# Patient Record
Sex: FEMALE | Race: OTHER
[De-identification: names, ages, dates, MRNs, and addresses within clinical notes are randomized per-mention and may not be internally consistent; named-entity substitution may affect disease eponyms.]

---

## 2018-06-20 ENCOUNTER — LABORATORY RESULT (OUTPATIENT)
Age: 38
End: 2018-06-20

## 2018-06-20 ENCOUNTER — APPOINTMENT (OUTPATIENT)
Dept: HEMATOLOGY ONCOLOGY | Facility: CLINIC | Age: 38
End: 2018-06-20

## 2018-06-20 VITALS
BODY MASS INDEX: 27.32 KG/M2 | TEMPERATURE: 96.4 F | HEIGHT: 65 IN | DIASTOLIC BLOOD PRESSURE: 88 MMHG | WEIGHT: 164 LBS | RESPIRATION RATE: 14 BRPM | HEART RATE: 75 BPM | SYSTOLIC BLOOD PRESSURE: 147 MMHG

## 2018-06-20 DIAGNOSIS — R76.0 RAISED ANTIBODY TITER: ICD-10-CM

## 2018-06-20 DIAGNOSIS — D47.3 ESSENTIAL (HEMORRHAGIC) THROMBOCYTHEMIA: ICD-10-CM

## 2018-06-20 DIAGNOSIS — I10 ESSENTIAL (PRIMARY) HYPERTENSION: ICD-10-CM

## 2018-06-20 DIAGNOSIS — N28.1 CYST OF KIDNEY, ACQUIRED: ICD-10-CM

## 2018-06-20 DIAGNOSIS — E83.19 OTHER DISORDERS OF IRON METABOLISM: ICD-10-CM

## 2018-06-21 PROBLEM — N28.1 KIDNEY CYSTS: Status: RESOLVED | Noted: 2018-06-21 | Resolved: 2018-06-21

## 2018-06-21 PROBLEM — I10 HYPERTENSION: Status: ACTIVE | Noted: 2018-06-21

## 2018-06-21 PROBLEM — R76.0 ANTIPHOSPHOLIPID ANTIBODY POSITIVE: Status: RESOLVED | Noted: 2018-06-21 | Resolved: 2018-06-21

## 2018-06-25 LAB
ALBUMIN SERPL ELPH-MCNC: 4.8 G/DL
ALP BLD-CCNC: 82 U/L
ALT SERPL-CCNC: 34 U/L
ANION GAP SERPL CALC-SCNC: 15 MMOL/L
AST SERPL-CCNC: 30 U/L
BILIRUB SERPL-MCNC: <0.2 MG/DL
BUN SERPL-MCNC: 13 MG/DL
CALCIUM SERPL-MCNC: 9.1 MG/DL
CHLORIDE SERPL-SCNC: 96 MMOL/L
CO2 SERPL-SCNC: 27 MMOL/L
CREAT SERPL-MCNC: 0.5 MG/DL
FERRITIN SERPL-MCNC: 241 NG/ML
GLUCOSE SERPL-MCNC: 164 MG/DL
HCT VFR BLD CALC: 39.1 %
HGB BLD-MCNC: 13.4 G/DL
IRON SATN MFR SERPL: 20 %
IRON SERPL-MCNC: 59 UG/DL
MCHC RBC-ENTMCNC: 30.5 PG
MCHC RBC-ENTMCNC: 34.3 G/DL
MCV RBC AUTO: 89.1 FL
PLATELET # BLD AUTO: 433 K/UL
PMV BLD: 9.2 FL
POTASSIUM SERPL-SCNC: 3.8 MMOL/L
PROT SERPL-MCNC: 8.3 G/DL
RBC # BLD: 4.39 M/UL
RBC # FLD: 11.9 %
SODIUM SERPL-SCNC: 138 MMOL/L
TIBC SERPL-MCNC: 301 UG/DL
TM INTERPRETATION: NORMAL
UIBC SERPL-MCNC: 242 UG/DL
WBC # FLD AUTO: 9.78 K/UL

## 2018-07-25 ENCOUNTER — APPOINTMENT (OUTPATIENT)
Dept: HEMATOLOGY ONCOLOGY | Facility: CLINIC | Age: 38
End: 2018-07-25

## 2018-07-25 ENCOUNTER — OUTPATIENT (OUTPATIENT)
Dept: OUTPATIENT SERVICES | Facility: HOSPITAL | Age: 38
LOS: 1 days | Discharge: HOME | End: 2018-07-25

## 2018-07-25 VITALS
HEART RATE: 66 BPM | DIASTOLIC BLOOD PRESSURE: 95 MMHG | SYSTOLIC BLOOD PRESSURE: 173 MMHG | WEIGHT: 164 LBS | RESPIRATION RATE: 16 BRPM | TEMPERATURE: 96.5 F | HEIGHT: 65 IN | BODY MASS INDEX: 27.32 KG/M2

## 2018-07-25 DIAGNOSIS — D47.3 ESSENTIAL (HEMORRHAGIC) THROMBOCYTHEMIA: ICD-10-CM

## 2018-09-19 ENCOUNTER — APPOINTMENT (OUTPATIENT)
Dept: HEMATOLOGY ONCOLOGY | Facility: CLINIC | Age: 38
End: 2018-09-19

## 2019-04-23 ENCOUNTER — APPOINTMENT (OUTPATIENT)
Dept: HEMATOLOGY ONCOLOGY | Facility: CLINIC | Age: 39
End: 2019-04-23

## 2019-07-11 ENCOUNTER — APPOINTMENT (OUTPATIENT)
Dept: GASTROENTEROLOGY | Facility: CLINIC | Age: 39
End: 2019-07-11

## 2019-07-11 VITALS
WEIGHT: 171 LBS | HEART RATE: 70 BPM | DIASTOLIC BLOOD PRESSURE: 90 MMHG | HEIGHT: 65 IN | BODY MASS INDEX: 28.49 KG/M2 | SYSTOLIC BLOOD PRESSURE: 180 MMHG

## 2022-02-11 ENCOUNTER — APPOINTMENT (OUTPATIENT)
Age: 42
End: 2022-02-11

## 2022-03-10 ENCOUNTER — APPOINTMENT (OUTPATIENT)
Age: 42
End: 2022-03-10

## 2023-06-19 ENCOUNTER — APPOINTMENT (OUTPATIENT)
Dept: ORTHOPEDIC SURGERY | Facility: CLINIC | Age: 43
End: 2023-06-19

## 2023-06-26 ENCOUNTER — NON-APPOINTMENT (OUTPATIENT)
Age: 43
End: 2023-06-26

## 2023-06-26 ENCOUNTER — APPOINTMENT (OUTPATIENT)
Dept: OTOLARYNGOLOGY | Facility: CLINIC | Age: 43
End: 2023-06-26
Payer: COMMERCIAL

## 2023-06-26 DIAGNOSIS — R05.9 COUGH, UNSPECIFIED: ICD-10-CM

## 2023-06-26 DIAGNOSIS — R09.82 POSTNASAL DRIP: ICD-10-CM

## 2023-06-26 PROCEDURE — 99204 OFFICE O/P NEW MOD 45 MIN: CPT | Mod: 25

## 2023-06-26 PROCEDURE — 31231 NASAL ENDOSCOPY DX: CPT

## 2023-06-26 NOTE — ASSESSMENT
[FreeTextEntry1] : continue augmentin.\par \par will need to switch Losartan later for possible persistent dry cough since she started it. \par \par Advised to do Flonase BID. \par Stop azelastine for now.

## 2023-06-26 NOTE — HISTORY OF PRESENT ILLNESS
[de-identified] : Patient presents today c/o coughing , PND , restlessness, congestion, rhinorrhea.   Patient was sick last week with hoarseness , coughing ,  PND . Presented with thick green sputum. She took Augmentin and is feeling some improvement. Has pain when swallowing.

## 2023-07-18 ENCOUNTER — APPOINTMENT (OUTPATIENT)
Dept: PULMONOLOGY | Facility: CLINIC | Age: 43
End: 2023-07-18

## 2023-12-20 ENCOUNTER — APPOINTMENT (OUTPATIENT)
Dept: OTOLARYNGOLOGY | Facility: CLINIC | Age: 43
End: 2023-12-20
Payer: COMMERCIAL

## 2023-12-20 DIAGNOSIS — R59.1 GENERALIZED ENLARGED LYMPH NODES: ICD-10-CM

## 2023-12-20 PROCEDURE — 99204 OFFICE O/P NEW MOD 45 MIN: CPT

## 2023-12-20 PROCEDURE — 99214 OFFICE O/P EST MOD 30 MIN: CPT

## 2023-12-20 NOTE — ASSESSMENT
[FreeTextEntry1] : Koki is a pleasant 41 yo female presenting with bilateral, painful level I LAD, likely secondary to recent dental work and TMJ pain  Recommend Augmentin 875/125 BID for 2 weeks. Keep follow up with dentist  Recommend TMJ management with advil, massage, stretches.  Follow up with me in 3-4 weeks.

## 2023-12-20 NOTE — HISTORY OF PRESENT ILLNESS
[FreeTextEntry1] : Patient presents today for c/o swollen lymph nodes. It started two nights ago on the right side and moved to the left. She feels as if her face is swollen. She is only getting clear mucus out of the nose. No further complaints.

## 2023-12-20 NOTE — PHYSICAL EXAM
[Midline] : trachea located in midline position [FreeTextEntry1] : Well appearing [Normal] : temporomandibular joint is normal [de-identified] : mild to moderate LAD of bilateral level 1 [de-identified] : Crepitus on palpation of left TMJ worse than right

## 2024-02-14 RX ORDER — AMOXICILLIN AND CLAVULANATE POTASSIUM 875; 125 MG/1; MG/1
875-125 TABLET, COATED ORAL
Qty: 28 | Refills: 0 | Status: DISCONTINUED | COMMUNITY
Start: 2023-12-20 | End: 2024-02-14

## 2024-02-20 ENCOUNTER — APPOINTMENT (OUTPATIENT)
Dept: GASTROENTEROLOGY | Facility: CLINIC | Age: 44
End: 2024-02-20
Payer: COMMERCIAL

## 2024-02-20 DIAGNOSIS — Z78.9 OTHER SPECIFIED HEALTH STATUS: ICD-10-CM

## 2024-02-20 DIAGNOSIS — R12 HEARTBURN: ICD-10-CM

## 2024-02-20 DIAGNOSIS — Z82.49 FAMILY HISTORY OF ISCHEMIC HEART DISEASE AND OTHER DISEASES OF THE CIRCULATORY SYSTEM: ICD-10-CM

## 2024-02-20 DIAGNOSIS — R73.03 PREDIABETES.: ICD-10-CM

## 2024-02-20 DIAGNOSIS — Z87.891 PERSONAL HISTORY OF NICOTINE DEPENDENCE: ICD-10-CM

## 2024-02-20 DIAGNOSIS — Z86.39 PERSONAL HISTORY OF OTHER ENDOCRINE, NUTRITIONAL AND METABOLIC DISEASE: ICD-10-CM

## 2024-02-20 DIAGNOSIS — K21.9 GASTRO-ESOPHAGEAL REFLUX DISEASE W/OUT ESOPHAGITIS: ICD-10-CM

## 2024-02-20 DIAGNOSIS — Z86.79 PERSONAL HISTORY OF OTHER DISEASES OF THE CIRCULATORY SYSTEM: ICD-10-CM

## 2024-02-20 PROCEDURE — 99204 OFFICE O/P NEW MOD 45 MIN: CPT

## 2024-02-20 RX ORDER — CAMPHOR 4%, MENTHOL 10%, METHYL SALICYLATE 30% 4; 10; 30 G/100ML; G/100ML; G/100ML
SPRAY TOPICAL
Refills: 0 | Status: ACTIVE | COMMUNITY

## 2024-02-20 RX ORDER — PANTOPRAZOLE 40 MG/1
40 TABLET, DELAYED RELEASE ORAL
Refills: 0 | Status: ACTIVE | COMMUNITY

## 2024-02-20 RX ORDER — PANTOPRAZOLE 40 MG/1
40 TABLET, DELAYED RELEASE ORAL DAILY
Qty: 1 | Refills: 6 | Status: ACTIVE | COMMUNITY
Start: 2024-02-20 | End: 1900-01-01

## 2024-02-20 RX ORDER — AMLODIPINE BESYLATE 5 MG/1
TABLET ORAL
Refills: 0 | Status: ACTIVE | COMMUNITY

## 2024-02-20 RX ORDER — LABETALOL HYDROCHLORIDE 300 MG/1
TABLET, FILM COATED ORAL
Refills: 0 | Status: ACTIVE | COMMUNITY

## 2024-02-20 RX ORDER — TIRZEPATIDE 5 MG/.5ML
5 INJECTION, SOLUTION SUBCUTANEOUS
Refills: 0 | Status: ACTIVE | COMMUNITY

## 2024-02-20 RX ORDER — LOSARTAN POTASSIUM 100 MG/1
TABLET, FILM COATED ORAL
Refills: 0 | Status: ACTIVE | COMMUNITY

## 2024-02-20 NOTE — HISTORY OF PRESENT ILLNESS
[FreeTextEntry1] : 43 year old female patient average risk for CRC, GISELA on CPAP, pre DM, HTN, HLP, now on munjaro, presents with worsening chronic GERD symptoms, namely at night with regurgitations. No nausea, vomiting or dysphagia. No GERD during the day.  Also went to Dr Brown and has LPR and prescribed PPI.  No unintended weight loss.  No blood in the stool, but has worsening constipation after starting munjaro.

## 2024-02-20 NOTE — PHYSICAL EXAM
[General Appearance - Alert] : alert [Sclera] : the sclera and conjunctiva were normal [Neck Appearance] : the appearance of the neck was normal [Abdomen Soft] : soft [Abdomen Tenderness] : non-tender [Skin Color & Pigmentation] : normal skin color and pigmentation [Oriented To Time, Place, And Person] : oriented to person, place, and time

## 2024-02-21 ENCOUNTER — RX RENEWAL (OUTPATIENT)
Age: 44
End: 2024-02-21

## 2024-03-12 RX ORDER — FAMOTIDINE 40 MG/1
40 TABLET, FILM COATED ORAL
Qty: 90 | Refills: 3 | Status: ACTIVE | COMMUNITY
Start: 2024-02-20 | End: 1900-01-01

## 2024-05-29 RX ORDER — PANTOPRAZOLE 40 MG/1
40 TABLET, DELAYED RELEASE ORAL
Qty: 90 | Refills: 0 | Status: ACTIVE | COMMUNITY
Start: 2024-01-17 | End: 1900-01-01

## 2024-10-07 ENCOUNTER — RX RENEWAL (OUTPATIENT)
Age: 44
End: 2024-10-07

## 2025-04-10 ENCOUNTER — RX RENEWAL (OUTPATIENT)
Age: 45
End: 2025-04-10

## 2025-06-26 ENCOUNTER — APPOINTMENT (OUTPATIENT)
Dept: OTOLARYNGOLOGY | Facility: CLINIC | Age: 45
End: 2025-06-26
Payer: COMMERCIAL

## 2025-06-26 PROCEDURE — 31575 DIAGNOSTIC LARYNGOSCOPY: CPT

## 2025-06-26 PROCEDURE — 99214 OFFICE O/P EST MOD 30 MIN: CPT | Mod: 25

## 2025-06-26 RX ORDER — BENZONATATE 100 MG/1
100 CAPSULE ORAL
Qty: 90 | Refills: 0 | Status: ACTIVE | COMMUNITY
Start: 2025-06-26 | End: 1900-01-01

## 2025-06-26 RX ORDER — FLUTICASONE PROPIONATE 50 UG/1
50 SPRAY NASAL TWICE DAILY
Qty: 1 | Refills: 11 | Status: ACTIVE | COMMUNITY
Start: 2025-06-26 | End: 1900-01-01

## 2025-06-26 RX ORDER — AZELASTINE 137 UG/1
137 SPRAY, METERED NASAL TWICE DAILY
Qty: 1 | Refills: 6 | Status: ACTIVE | COMMUNITY
Start: 2025-06-26 | End: 1900-01-01

## 2025-06-26 RX ORDER — DIPHENHYDRAMINE HYDROCHLORIDE AND LIDOCAINE HYDROCHLORIDE AND ALUMINUM HYDROXIDE AND MAGNESIUM HYDRO
KIT
Qty: 1 | Refills: 3 | Status: ACTIVE | COMMUNITY
Start: 2025-06-26 | End: 1900-01-01

## 2025-06-27 ENCOUNTER — TRANSCRIPTION ENCOUNTER (OUTPATIENT)
Age: 45
End: 2025-06-27

## 2025-06-30 LAB — BACTERIA THROAT CULT: NORMAL

## 2025-07-02 ENCOUNTER — APPOINTMENT (OUTPATIENT)
Dept: PULMONOLOGY | Facility: CLINIC | Age: 45
End: 2025-07-02
Payer: COMMERCIAL

## 2025-07-02 VITALS
SYSTOLIC BLOOD PRESSURE: 148 MMHG | WEIGHT: 150 LBS | DIASTOLIC BLOOD PRESSURE: 88 MMHG | HEIGHT: 65 IN | HEART RATE: 88 BPM | OXYGEN SATURATION: 97 % | BODY MASS INDEX: 24.99 KG/M2

## 2025-07-02 PROBLEM — J06.9 VIRAL UPPER RESPIRATORY TRACT INFECTION: Status: ACTIVE | Noted: 2025-06-26 | Resolved: 2025-07-26

## 2025-07-02 PROCEDURE — 99204 OFFICE O/P NEW MOD 45 MIN: CPT | Mod: 25

## 2025-07-02 PROCEDURE — 71046 X-RAY EXAM CHEST 2 VIEWS: CPT

## 2025-07-02 RX ORDER — BUDESONIDE AND FORMOTEROL FUMARATE DIHYDRATE 80; 4.5 UG/1; UG/1
80-4.5 AEROSOL RESPIRATORY (INHALATION) TWICE DAILY
Qty: 1 | Refills: 1 | Status: ACTIVE | COMMUNITY
Start: 2025-07-02 | End: 1900-01-01

## 2025-07-02 RX ORDER — PREDNISONE 20 MG/1
20 TABLET ORAL
Qty: 15 | Refills: 0 | Status: ACTIVE | COMMUNITY
Start: 2025-07-02 | End: 1900-01-01

## 2025-07-07 ENCOUNTER — RX RENEWAL (OUTPATIENT)
Age: 45
End: 2025-07-07

## 2025-07-09 ENCOUNTER — OUTPATIENT (OUTPATIENT)
Dept: OUTPATIENT SERVICES | Facility: HOSPITAL | Age: 45
LOS: 1 days | End: 2025-07-09
Payer: COMMERCIAL

## 2025-07-09 DIAGNOSIS — J45.998 OTHER ASTHMA: ICD-10-CM

## 2025-07-09 PROCEDURE — 87205 SMEAR GRAM STAIN: CPT

## 2025-07-09 PROCEDURE — 87070 CULTURE OTHR SPECIMN AEROBIC: CPT

## 2025-07-09 PROCEDURE — 87181 SC STD AGAR DILUTION PER AGT: CPT

## 2025-07-09 PROCEDURE — 87077 CULTURE AEROBIC IDENTIFY: CPT

## 2025-07-10 DIAGNOSIS — J45.998 OTHER ASTHMA: ICD-10-CM

## 2025-07-13 LAB — BACTERIA SPT CULT: ABNORMAL

## 2025-07-16 ENCOUNTER — APPOINTMENT (OUTPATIENT)
Dept: PULMONOLOGY | Facility: CLINIC | Age: 45
End: 2025-07-16
Payer: COMMERCIAL

## 2025-07-16 VITALS — DIASTOLIC BLOOD PRESSURE: 78 MMHG | OXYGEN SATURATION: 98 % | SYSTOLIC BLOOD PRESSURE: 122 MMHG | HEART RATE: 66 BPM

## 2025-07-16 PROCEDURE — 99214 OFFICE O/P EST MOD 30 MIN: CPT

## 2025-07-16 PROCEDURE — G2211 COMPLEX E/M VISIT ADD ON: CPT | Mod: NC

## 2025-07-22 ENCOUNTER — NON-APPOINTMENT (OUTPATIENT)
Age: 45
End: 2025-07-22

## 2025-07-24 ENCOUNTER — APPOINTMENT (OUTPATIENT)
Dept: GASTROENTEROLOGY | Facility: CLINIC | Age: 45
End: 2025-07-24
Payer: COMMERCIAL

## 2025-07-24 VITALS — HEIGHT: 65 IN | BODY MASS INDEX: 24.99 KG/M2 | WEIGHT: 150 LBS

## 2025-07-24 DIAGNOSIS — Z86.39 PERSONAL HISTORY OF OTHER ENDOCRINE, NUTRITIONAL AND METABOLIC DISEASE: ICD-10-CM

## 2025-07-24 DIAGNOSIS — Z12.11 ENCOUNTER FOR SCREENING FOR MALIGNANT NEOPLASM OF COLON: ICD-10-CM

## 2025-07-24 DIAGNOSIS — K21.9 GASTRO-ESOPHAGEAL REFLUX DISEASE W/OUT ESOPHAGITIS: ICD-10-CM

## 2025-07-24 DIAGNOSIS — Z86.79 PERSONAL HISTORY OF OTHER DISEASES OF THE CIRCULATORY SYSTEM: ICD-10-CM

## 2025-07-24 PROCEDURE — 99214 OFFICE O/P EST MOD 30 MIN: CPT

## 2025-07-24 RX ORDER — ALBUTEROL SULFATE 90 UG/1
108 (90 BASE) INHALANT RESPIRATORY (INHALATION)
Qty: 1 | Refills: 3 | Status: DISCONTINUED | COMMUNITY
Start: 2025-07-16 | End: 2025-07-24

## 2025-07-24 RX ORDER — SODIUM SULFATE, MAGNESIUM SULFATE, AND POTASSIUM CHLORIDE 17.75; 2.7; 2.25 G/1; G/1; G/1
1479-225-188 TABLET ORAL
Qty: 24 | Refills: 0 | Status: ACTIVE | COMMUNITY
Start: 2025-07-24 | End: 1900-01-01

## 2025-08-18 ENCOUNTER — APPOINTMENT (OUTPATIENT)
Dept: PULMONOLOGY | Facility: CLINIC | Age: 45
End: 2025-08-18
Payer: COMMERCIAL

## 2025-08-18 VITALS
HEART RATE: 80 BPM | BODY MASS INDEX: 24.79 KG/M2 | OXYGEN SATURATION: 98 % | DIASTOLIC BLOOD PRESSURE: 92 MMHG | SYSTOLIC BLOOD PRESSURE: 164 MMHG | WEIGHT: 149 LBS

## 2025-08-18 DIAGNOSIS — J45.998 OTHER ASTHMA: ICD-10-CM

## 2025-08-18 DIAGNOSIS — R91.1 SOLITARY PULMONARY NODULE: ICD-10-CM

## 2025-08-18 DIAGNOSIS — R05.9 COUGH, UNSPECIFIED: ICD-10-CM

## 2025-08-18 PROCEDURE — G2211 COMPLEX E/M VISIT ADD ON: CPT | Mod: NC

## 2025-08-18 PROCEDURE — 99213 OFFICE O/P EST LOW 20 MIN: CPT
